# Patient Record
Sex: MALE
[De-identification: names, ages, dates, MRNs, and addresses within clinical notes are randomized per-mention and may not be internally consistent; named-entity substitution may affect disease eponyms.]

---

## 2021-09-30 ENCOUNTER — HOSPITAL ENCOUNTER (EMERGENCY)
Dept: HOSPITAL 60 - LB.ED | Age: 56
Discharge: HOME | End: 2021-09-30
Payer: COMMERCIAL

## 2021-09-30 DIAGNOSIS — F10.129: Primary | ICD-10-CM
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
## 2021-09-30 PROCEDURE — A0429 BLS-EMERGENCY: HCPCS

## 2021-09-30 PROCEDURE — A0425 GROUND MILEAGE: HCPCS
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
